# Patient Record
Sex: FEMALE | Race: WHITE
[De-identification: names, ages, dates, MRNs, and addresses within clinical notes are randomized per-mention and may not be internally consistent; named-entity substitution may affect disease eponyms.]

---

## 2018-06-29 ENCOUNTER — HOSPITAL ENCOUNTER (OUTPATIENT)
Dept: HOSPITAL 80 - FIMAGING | Age: 53
End: 2018-06-29
Attending: FAMILY MEDICINE
Payer: COMMERCIAL

## 2018-06-29 DIAGNOSIS — R10.11: Primary | ICD-10-CM

## 2018-11-11 ENCOUNTER — HOSPITAL ENCOUNTER (EMERGENCY)
Dept: HOSPITAL 80 - FED | Age: 53
Discharge: HOME | End: 2018-11-11
Payer: COMMERCIAL

## 2018-11-11 VITALS — SYSTOLIC BLOOD PRESSURE: 132 MMHG | DIASTOLIC BLOOD PRESSURE: 69 MMHG
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
## 2018-11-11 DIAGNOSIS — S22.000A: Primary | ICD-10-CM
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
## 2018-11-11 NOTE — EDPHY
General


Time Seen by Provider: 11/11/18 09:53


Narrative: 





CHIEF COMPLAINT: 


Fall, back pain





HISTORY OF PRESENT ILLNESS: 


Patient presents by private vehicle with spouse with complaints of fall and 

back pain.  She states that she was walking out of her house down stairs, when 

she slipped and hit her back on the steps.  She has severe pain in the 

midthoracic back.  It is severe enough that she has difficulty moving and 

taking a deep breath.  Minimal improvement rest.  She has no numbness, tingling 

or weakness.  She does have some shaking of her left hand with this.  She has 

no head strike or loss of consciousness.  No neck or lumbar back pain.  No 

abdominal or chest pain.  No other associated complaints or modifying factors.





REVIEW OF SYSTEMS:


10 systems were reviewed and negative with the exception of the elements 

mentioned in the history of present illness.





PAST MEDICAL HISTORY: 


Denies any medical history





PAST SURGICAL HISTORY:


No surgical history





SOCIAL HISTORY:


Nonsmoker.





FAMILY HISTORY:


Noncontributory





EXAMINATION:


Vitals: Triage VS reviewed


General Appearance:  Alert, no distress


Head: normocephalic, atraumatic. No [place] sign. No raccoon eyes.  No 

depression or deformity.


Eyes:  Pupils equal and round, no conjunctival pallor or injection.  EOM 

symmetric no nystagmus.


Neck:  Normal inspection, supple, non-tender


Respiratory:  Lungs are clear to auscultation


Cardiovascular:  Regular rate and rhythm.  No murmur.  Good signs of perfusion 

distally


Gastrointestinal:  Abdomen is soft and nontender


Back:  Midline tenderness and swelling the mid thoracic spine with no crepitus 

or deformity.  No lumbar tenderness midline or paraspinous.


Neurological:  A&O, nonfocal, normal gait.  Light sensory symmetric lower 

extremities.  Strength is symmetric lower extremities.  Patellar reflexes are 2

+ symmetrically.


Skin:  Warm and dry, no rash.  No petechiae, purpura, laceration or puncture.


Extremities:  Nontender, no pedal edema


Psychiatric:  Mood and affect normal





DIFFERENTIAL DIAGNOSES:


Including but not limited to thoracic fracture, sprain, strain, subluxation, 

spondylolisthesis, spondylosis








MDM:


10:00 a.m.


Reported mechanical fall just prior to arrival with midthoracic tenderness and 

mild swelling just left of midline.  She is neuro intact distally.  She does 

appear to be in discomfort in very anxious about this, thus I have ordered IV 

placement for pain control.  There is no head strike or loss of conscious.  No 

evidence of acute cord compression or cauda equina.  I have ordered plain film 

of the thoracic spine, her only area of pain.  No anticoagulants.  No distress.





10:13 a.m.


X-ray as read by me, without radiologist, reveals possible compression fracture 

with spondylolisthesis.  Pending Radiology interpretation.





10:50 a.m.


I reviewed the x-ray with Dr. Barry we agree that CT scan of the thoracic 

spine without contrast is indicated to further evaluate the injury.  Patient is 

agreeable to this.





11:45 a.m. 


Notified by radiologist Dr. Bassem Mcnair.  There is an acute compression fracture 

at T8.  No retropulsion.  No fracture of the adjacent vertebrae. 





11:55 a.m.


Case discussed with neurosurgeon Dr. Hernandez.  He agrees with our order of a 

[place]livia brace.  He recommends follow-up in 2-3 weeks in his office.  Routine 

medications as needed.  Patient re-evaluated.  I discussed the CT imaging, my 

discussion with Neurosurgery and the forthcoming brace placement.  She is in no 

acute distress.  Her pain is tolerable with no neuro complaints.





1:30 p.m.


[email or web address] has arrived and provided her Olivia brace.  She is feeling 

better in the brace.  We discussed discharge home with pain medication, anti-

inflammatories and muscle relaxant as needed.  We discussed follow up with 

Neurosurgery in 2-3 weeks.  We discussed ED precautions.  She is comfortable 

this plan.  Discharged home stable condition.





SUPERVISION:


Patient was independently examined, but I discussed the case with my secondary 

supervising physician Dr. Barry








CONSULTATION:


Neurosurgery, Dr. David Bartholomew prosthetics, Olivia Brace placement








- Diagnostics


Imaging Results: 


 Imaging Impressions





Thoracic Spine X-Ray  11/11/18 09:59


Impression: Negative for definite acute fracture with degenerative changes 

noted.








Thoracic Spine CT  11/11/18 10:49


Impression: Mild acute compression deformity of T8. There is no associated 

canal impingement.


 


Results called and discussed with Nas Sandoval PA-C on November 11, 2018 at 

1137 hours.














- History


Smoking Status: Never smoked





- Objective


Vital Signs: 


 Initial Vital Signs











Heart Rate  91   11/11/18 09:49


 


Respiratory Rate  18   11/11/18 09:49


 


Blood Pressure  107/74   11/11/18 09:49


 


O2 Sat (%)  98   11/11/18 09:49








 











O2 Delivery Mode               Room Air














Allergies/Adverse Reactions: 


 





gluten Allergy (Verified 11/11/18 09:51)


 


latex Allergy (Verified 11/11/18 09:51)


 


Penicillins Allergy (Verified 11/11/18 09:51)


 








Home Medications: 














 Medication  Instructions  Recorded


 


Cyclobenzaprine [Flexeril 10 MG 10 mg PO TID PRN #20 tab 11/11/18





(*)]  


 


oxyCODONE HCL/ACETAMINOPHEN 1 each PO Q4-6PRN PRN #19 tablet 11/11/18





[Percocet 5-325 mg Tablet]  











Medications Given: 


 








Discontinued Medications





Cyclobenzaprine HCl (Flexeril)  10 mg PO EDNOW ONE


   Stop: 11/11/18 11:52


   Last Admin: 11/11/18 12:01 Dose:  10 mg


Ibuprofen (Motrin)  600 mg PO EDNOW ONE


   Stop: 11/11/18 12:30


   Last Admin: 11/11/18 12:57 Dose:  600 mg


Morphine Sulfate (Morphine)  4 mg IVP EDNOW ONE


   Stop: 11/11/18 09:59


   Last Admin: 11/11/18 10:12 Dose:  4 mg


Morphine Sulfate (Morphine)  4 mg IVP EDNOW ONE


   Stop: 11/11/18 10:55


   Last Admin: 11/11/18 11:01 Dose:  4 mg


Ondansetron HCl (Zofran)  4 mg IVP EDNOW ONE


   Stop: 11/11/18 09:59


   Last Admin: 11/11/18 10:13 Dose:  4 mg








Departure





- Departure


Disposition: Home, Routine, Self-Care


Clinical Impression: 


Thoracic compression fracture


Qualifiers:


 Encounter type: initial encounter Fracture type: closed Qualified Code(s): 

S22.000A - Wedge compression fracture of unspecified thoracic vertebra, initial 

encounter for closed fracture





Fall


Qualifiers:


 Encounter type: initial encounter Qualified Code(s): W19.XXXA - Unspecified 

fall, initial encounter





Condition: Good


Instructions:  Thoracolumbar Fracture (ED), Thoracic Pain (ED)


Additional Instructions: 


1. [place] brace as discussed and demonstrated by technician


2. Contact neurosurgeon for outpatient follow-up in 2-3 weeks


3. Medications as prescribed as needed for symptom control


4. ED precautions for worsening pain, numbness, tingling, weakness or inability 

to ambulate


Referrals: 


Jeromy Hernandez MD [Medical Doctor] - As per Instructions


Prescriptions: 


Cyclobenzaprine [Flexeril 10 MG (*)] 10 mg PO TID PRN #20 tab


 PRN Reason: Spasms


oxyCODONE HCL/ACETAMINOPHEN [Percocet 5-325 mg Tablet] 1 each PO Q4-6PRN PRN #

19 tablet


 PRN Reason: Pain, Breakthrough

## 2019-02-12 ENCOUNTER — HOSPITAL ENCOUNTER (OUTPATIENT)
Dept: HOSPITAL 80 - BMCIMAGING | Age: 54
End: 2019-02-12
Attending: FAMILY MEDICINE
Payer: COMMERCIAL

## 2019-02-12 DIAGNOSIS — M79.642: Primary | ICD-10-CM

## 2019-02-17 ENCOUNTER — HOSPITAL ENCOUNTER (EMERGENCY)
Dept: HOSPITAL 80 - FED | Age: 54
Discharge: HOME | End: 2019-02-17
Payer: COMMERCIAL

## 2019-02-17 VITALS — DIASTOLIC BLOOD PRESSURE: 64 MMHG | SYSTOLIC BLOOD PRESSURE: 132 MMHG
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
## 2019-02-17 DIAGNOSIS — S52.612A: ICD-10-CM
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
## 2019-02-17 DIAGNOSIS — S52.572A: Primary | ICD-10-CM
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
## 2019-02-17 PROCEDURE — 2W3FX1Z IMMOBILIZATION OF LEFT HAND USING SPLINT: ICD-10-PCS | Performed by: EMERGENCY MEDICINE

## 2019-02-17 PROCEDURE — A4565 SLINGS: HCPCS

## 2019-02-17 NOTE — EDPHY
H & P


Stated Complaint: [facility] fall, LEFT wrist pain swelling.


Time Seen by Provider: 02/17/19 01:33


HPI/ROS: 





HPI





CHIEF COMPLAINT:  MECHANICAL TRIP AND FALL, LEFT WRIST PAIN.





HISTORY OF PRESENT ILLNESS:  Patient very pleasant 53-year-old female she 

presents emergency room with left wrist pain.  Patient states that she was 

going over a gate in her house and fell, landing on her left outstretched hand 

wrist.  She now has distal left wrist pain and swelling.  This happened 45 min 

ago.  Denies any focal numbness or tingling.  Denies any other areas of injury 

except left wrist.





Past Medical History:  Denies significant medical history





Past Surgical History:  Denies significant surgical history





Social History:  Denies drugs alcohol tobacco.





Family History:  Noncontributory








ROS   


REVIEW OF SYSTEMS:


10 Systems were reviewed and negative with the exception of the elements 

mentioned in the history of present illness.








Exam   


Constitutional   triage nursing summary reviewed, vital signs reviewed, awake/

alert. 


Eyes   normal conjunctivae and sclera, EOMI, PERRLA. 


HENT   normal inspection, atraumatic, moist mucus membranes, no epistaxis, neck 

supple/ no meningismus, no raccoon eyes. 


Respiratory   clear to auscultation bilaterally, normal breath sounds, no 

respiratory distress, no wheezing. 


Cardiovascular   rate normal, regular rhythm, no murmur, no edema, distal 

pulses normal. 


Gastrointestinal   soft, non-tender, no rebound, no guarding, normal bowel 

sounds, no distension, no pulsatile mass. 


Genitourinary   no CVA tenderness. 


Musculoskeletal left wrist:  Swelling noted over the distal radius and distal 

ulnar, good radial pulse, good cap refill, good  strength, able to move all 

her fingers.  Closed injury.  Most focally tender over distal radius.


 no midline vertebral tenderness, full range of motion, no calf swelling, no 

tenderness of extremities, no meningismus, good pulses, neurovascularly intact.


Skin   pink, warm, & dry, no rash, skin atraumatic. 


Neurologic   awake, alert and oriented x 3, AAOx3, moves all 4 extremities 

equally, motor intact, sensory intact, CN II-XII intact, normal cerebellar, 

normal vision, normal speech. 


Psychiatric   normal mood/affect. 


Heme/Lymph/Immune   no lymphadenopathy.





Differential Diagnosis:  Includes but is not limited to in a particular order 

wrist fracture, impacted fracture, ulnar fracture, radius fracture





Medical Decision Making:  Plan for this patient x-ray left wrist, ice pack, 

Norco for pain and re-evaluate.





Re-evaluation:








X-ray left wrist reviewed.  This shows a distal ulnar styloid fracture, 

additionally there is a distal radius fracture intra-articular.





Patient be splinted in a sugar-tong splint.





Patient need to follow up with Orthopedics








On re-examination of the patient she is in her sugar-tong splint.  Comfortable 

however the end of the splint is went will need to be redone.





Additionally the patient states when she went over the case and fell on her 

outstretched left hand she straddle gait and fell on it.  She complains of some 

right-sided suprapubic pain.





I was able to examine her with Mercy SORTO, as chaperone.  There is no perineal 

laceration no vaginal external laceration.  No hematoma.  No evidence of 

ecchymosis however patient does have some point tenderness over her right-sided 

suprapubic bone.





Plan will be to check urinalysis for blood.


Additionally check pelvic x-ray.





If these are normal we talked about going home however,  also she understands 

she develops worsening abdominal pain, pelvic pain, fever, vomiting she needs 

return emergency room she is comfortable this plan.





Additionally compartment syndrome discussed about her wrist.  Numbness or 

tingling severe pain, she understands return emergency room.





Recommend anti-inflammatory pain medicine, ice,


Splint for comfort.





She needs follow-up with Orthopedics.








Urinalysis reveals no blood.





Pelvis x-ray shows no evidence of fracture.





The patient has been splinted in a sugar-tong splint a 2nd time as the splint 

got wet.


She is neurovascularly intact at 5:29 a.m..  She has good sensation good cap 

refill, good radial pulse.  Sensation intact.  No compartment syndrome.  She 

feels comfortable in her splint.





I did discussed return precautions with her she understands return emergency 

room she develops worsening wrist pain, arm pain, not doing well additionally 

she understands if she has worsening pelvis pain, abdominal pain, vomiting, not 

doing well she needs return emergency room.





Watch for abdominal pain, blood in her urine, worsening pelvic pain.





On exam with Mrecy SORTO as chaperone there was no evidence of straddle injury or 

peroneal hematoma or laceration.  She had focal tenderness over her right 

suprapubic bone.  The x-ray has been reviewed by myself and I do not appreciate 

an fracture.


Source: Patient





- Personal History


Current Tetanus/Diphtheria Vaccine: Yes


Current Tetanus Diphtheria and Acellular Pertussis (TDAP): Yes





- Medical/Surgical History


Hx Asthma: No


Hx Chronic Respiratory Disease: No


Hx Diabetes: No


Hx Cardiac Disease: No


Hx Renal Disease: No


Hx Cirrhosis: No


Hx Alcoholism: No


Other PMH: Denies





- Social History


Smoking Status: Never smoked


Constitutional: 


 Initial Vital Signs











Temperature (C)  36.5 C   02/17/19 01:24


 


Heart Rate  91   02/17/19 01:24


 


Respiratory Rate  20   02/17/19 01:24


 


Blood Pressure  114/88 H  02/17/19 01:24


 


O2 Sat (%)  97   02/17/19 01:24








 











O2 Delivery Mode               Room Air














Allergies/Adverse Reactions: 


 





gluten Allergy (Verified 02/17/19 01:23)


 


latex Allergy (Verified 02/17/19 01:23)


 


Penicillins Allergy (Verified 02/17/19 01:23)


 








Home Medications: 














 Medication  Instructions  Recorded


 


Calcium  02/17/19


 


Ibuprofen [Motrin (*)] 800 mg PO Q6-8PRN #10 tab 02/17/19


 


Potassium Citrate  02/17/19


 


Zinc  02/17/19


 


oxyCODONE/APAP 5/325 [Percocet 1 - 2 tab PO Q6H PRN #10 tab 02/17/19





5/325 (*)]  














Medical Decision Making





- Data Points


Laboratory Results: 


 











  02/17/19





  04:30


 


Urine Color  YELLOW 





  


 


Urine Appearance  CLEAR 





  


 


Urine pH  5.0 





   (5.0-7.5) 


 


Ur Specific Gravity  1.029 





   (1.002-1.030) 


 


Urine Protein  NEGATIVE 





   (NEGATIVE) 


 


Urine Ketones  1+  H 





   (NEGATIVE) 


 


Urine Blood  NEGATIVE 





   (NEGATIVE) 


 


Urine Nitrate  NEGATIVE 





   (NEGATIVE) 


 


Urine Bilirubin  NEGATIVE 





   (NEGATIVE) 


 


Urine Urobilinogen  NEGATIVE EU EU





   (0.2-1.0) 


 


Ur Leukocyte Esterase  NEGATIVE 





   (NEGATIVE) 


 


Urine Glucose  NEGATIVE 





   (NEGATIVE) 











Medications Given: 


 








Discontinued Medications





Acetaminophen (Tylenol)  1,000 mg PO EDNOW ONE


   Stop: 02/17/19 03:01


   Last Admin: 02/17/19 05:54 Dose:  Not Given


Hydrocodone Bitart/Acetaminophen (Norco 10/325)  1 tab PO EDNOW ONE


   Stop: 02/17/19 01:37


   Last Admin: 02/17/19 02:31 Dose:  Not Given


Ibuprofen (Motrin)  800 mg PO EDNOW ONE


   Stop: 02/17/19 03:01


   Last Admin: 02/17/19 03:08 Dose:  800 mg


Oxycodone/Acetaminophen (Percocet 5/325)  2 tab PO EDNOW ONE


   Stop: 02/17/19 01:55


   Last Admin: 02/17/19 01:56 Dose:  2 tab


Oxycodone/Acetaminophen (Percocet 5/325mg Prepack#4)  1 btl TAKEHOME EDNOW ONE


   Stop: 02/17/19 03:10


   Last Admin: 02/17/19 03:13 Dose:  1 btl








Departure





- Departure


Disposition: Home, Routine, Self-Care


Clinical Impression: 


 Wrist fracture





Condition: Good


Instructions:  Wrist Fracture in Adults (ED)


Additional Instructions: 


1. Follow up with Orthopedics.


2. Anti-inflammatory pain medicine for mild pain


3. Norco for severe pain


4. Keep your arm elevated


5. Ice.





Referrals: 


Sukhdeep Shipman MD [Primary Care Provider] - As per Instructions


Luis Carlos Sargent MD [Medical Doctor] - As per Instructions


Prescriptions: 


Ibuprofen [Motrin (*)] 800 mg PO Q6-8PRN #10 tab


oxyCODONE/APAP 5/325 [Percocet 5/325 (*)] 1 - 2 tab PO Q6H PRN #10 tab


 PRN Reason: Pain, Severe

## 2019-02-17 NOTE — ASMTCAGE
CAGE

 

Additional Comments           SBIRT inaccurately documented (it said pt could not 

                              participate but all the answers were 'No'; per ED 

                              MD Report, pt was AxOx4 in the ED. No CAGE 

                              indicated and pt discharged before it could be 

                              completed anyway

 

Date Signed:  02/17/2019 11:53 AM

Electronically Signed By:Michelle Domingo RN